# Patient Record
Sex: FEMALE | Race: WHITE | Employment: STUDENT | URBAN - METROPOLITAN AREA
[De-identification: names, ages, dates, MRNs, and addresses within clinical notes are randomized per-mention and may not be internally consistent; named-entity substitution may affect disease eponyms.]

---

## 2021-11-25 ENCOUNTER — HOSPITAL ENCOUNTER (EMERGENCY)
Age: 21
Discharge: HOME | End: 2021-11-25
Attending: EMERGENCY MEDICINE
Payer: COMMERCIAL

## 2021-11-25 VITALS
SYSTOLIC BLOOD PRESSURE: 113 MMHG | HEIGHT: 67 IN | HEART RATE: 82 BPM | OXYGEN SATURATION: 99 % | DIASTOLIC BLOOD PRESSURE: 70 MMHG | BODY MASS INDEX: 21.19 KG/M2 | RESPIRATION RATE: 18 BRPM | WEIGHT: 135 LBS | TEMPERATURE: 98.1 F

## 2021-11-25 DIAGNOSIS — F41.9 ANXIETY: ICD-10-CM

## 2021-11-25 DIAGNOSIS — R07.81 RIB PAIN: Primary | ICD-10-CM

## 2021-11-25 PROCEDURE — 99281 EMR DPT VST MAYX REQ PHY/QHP: CPT

## 2021-11-25 RX ORDER — HYDROXYZINE PAMOATE 25 MG/1
25 CAPSULE ORAL
Qty: 20 CAPSULE | Refills: 0 | Status: SHIPPED | OUTPATIENT
Start: 2021-11-25 | End: 2021-12-01

## 2021-11-26 NOTE — ED NOTES
Patient left ED without discharge paperwork or prescription. Prescription given to  to be filed with chart in case patient returns. Patient left ED via Discharge Method: ambulatory to Home with self. Sitter witnessed patient leave room. Opportunity for questions and clarification provided. One Rx available to patient. To continue your aftercare when you leave the hospital, you may receive an automated call from our care team to check in on how you are doing. This is a free service and part of our promise to provide the best care and service to meet your aftercare needs.  If you have questions, or wish to unsubscribe from this service please call [phone]. Thank you for Choosing [facility].

## 2021-11-26 NOTE — DISCHARGE INSTRUCTIONS
Continue low heat, Advil or Aleve to area  Trial on Vistaril for anxiety  See your doctor next week as scheduled for ongoing evaluation and work-up

## 2021-11-26 NOTE — ED TRIAGE NOTES
Pt comes in c/o \" a lump on the R side of my ribs and back pain\". States she noticed a lump on her rib last week. States she got concerned because it was painless last week but started feeling pain in it. Denied any fall, injuries. States she does Yoga but nothing strenuous. Denied fever, NVD or any other complaints. Stated [employer] said it could be cancer so she got concerned and came to be seen.

## 2021-11-28 NOTE — ED PROVIDER NOTES
Patient is here with her mother both provide some of the history. She has had some soreness at her right costal margin for several days. She touch no injury that she recalls other than stretching for yoga. No redness or abscess type changes. Worse to palpation and certain movements. Left side is uninvolved. The history is provided by the patient and a parent. Rib Pain  This is a new problem. The current episode started more than 2 days ago. Pertinent negatives include no fever, no coryza, no rhinorrhea, no cough, no sputum production, no orthopnea, no chest pain, no syncope, no leg pain and no leg swelling. She has tried nothing for the symptoms. Associated medical issues do not include COPD or chronic lung disease. No past medical history on file. No past surgical history on file. No family history on file. Social History     Socioeconomic History    Marital status: SINGLE     Spouse name: Not on file    Number of children: Not on file    Years of education: Not on file    Highest education level: Not on file   Occupational History    Not on file   Tobacco Use    Smoking status: Not on file    Smokeless tobacco: Not on file   Substance and Sexual Activity    Alcohol use: Not on file    Drug use: Not on file    Sexual activity: Not on file   Other Topics Concern    Not on file   Social History Narrative    Not on file     Social Determinants of Health     Financial Resource Strain:     Difficulty of Paying Living Expenses: Not on file   Food Insecurity:     Worried About Running Out of Food in the Last Year: Not on file    Isaías of Food in the Last Year: Not on file   Transportation Needs:     Lack of Transportation (Medical): Not on file    Lack of Transportation (Non-Medical):  Not on file   Physical Activity:     Days of Exercise per Week: Not on file    Minutes of Exercise per Session: Not on file   Stress:     Feeling of Stress : Not on file   Social Connections:  Frequency of Communication with Friends and Family: Not on file    Frequency of Social Gatherings with Friends and Family: Not on file    Attends [employer] Services: Not on file    Active Member of Clubs or Organizations: Not on file    Attends Club or Organization Meetings: Not on file    Marital Status: Not on file   Intimate Partner Violence:     Fear of Current or Ex-Partner: Not on file    Emotionally Abused: Not on file    Physically Abused: Not on file    Sexually Abused: Not on file   Housing Stability:     Unable to Pay for Housing in the Last Year: Not on file    Number of [place] in the Last Year: Not on file    Unstable Housing in the Last Year: Not on file         ALLERGIES: Patient has no known allergies. Review of Systems   Constitutional: Negative for fever. HENT: Negative for rhinorrhea. Respiratory: Negative for cough and sputum production. Cardiovascular: Negative for chest pain, orthopnea, leg swelling and syncope. All other systems reviewed and are negative. Vitals:    11/25/21 2113   BP: 113/70   Pulse: 82   Resp: 18   Temp: 98.1 °F (36.7 °C)   SpO2: 99%   Weight: 61.2 kg (135 lb)   Height: 5' 7\" (1.702 m)            Physical Exam  Vitals and nursing note reviewed. Constitutional:       General: She is not in acute distress. Appearance: She is well-developed. HENT:      Head: Atraumatic. Eyes:      General: No scleral icterus. Cardiovascular:      Rate and Rhythm: Normal rate and regular rhythm. Pulses: Normal pulses. Heart sounds: Normal heart sounds. Pulmonary:      Effort: Pulmonary effort is normal. No respiratory distress. Breath sounds: No stridor. No wheezing. Chest:      Chest wall: Tenderness present. Abdominal:      General: Abdomen is flat. Palpations: Abdomen is soft. Tenderness: There is no abdominal tenderness. There is no right CVA tenderness, left CVA tenderness, guarding or rebound. Musculoskeletal:         General: No tenderness. Cervical back: Neck supple. Right lower leg: No edema. Left lower leg: No edema. Skin:     General: Skin is warm and dry. Neurological:      Mental Status: Mental status is at baseline. Psychiatric:         Thought Content: Thought content normal.          MDM  Number of Diagnoses or Management Options  Anxiety  Rib pain  Diagnosis management comments: Reducible pain to right costal margin. Patient had read on [employer] things because her to be more worried than appropriate.   At present this seems to be purely mild inflammatory does not appear to be malignant which is her biggest concern she will follow up with any changes or worsening or failure to improve       Amount and/or Complexity of Data Reviewed  Obtain history from someone other than the patient: yes    Risk of Complications, Morbidity, and/or Mortality  Presenting problems: moderate  Management options: moderate    Patient Progress  Patient progress: stable         Procedures

## 2021-12-01 PROBLEM — F41.8 MIXED ANXIETY DEPRESSIVE DISORDER: Status: ACTIVE | Noted: 2021-03-30
